# Patient Record
Sex: FEMALE | Race: BLACK OR AFRICAN AMERICAN | URBAN - METROPOLITAN AREA
[De-identification: names, ages, dates, MRNs, and addresses within clinical notes are randomized per-mention and may not be internally consistent; named-entity substitution may affect disease eponyms.]

---

## 2018-05-06 ENCOUNTER — HOSPITAL ENCOUNTER (EMERGENCY)
Facility: CLINIC | Age: 23
Discharge: HOME | End: 2018-05-06
Attending: EMERGENCY MEDICINE | Admitting: EMERGENCY MEDICINE
Payer: COMMERCIAL

## 2018-05-06 VITALS
SYSTOLIC BLOOD PRESSURE: 114 MMHG | HEIGHT: 60 IN | BODY MASS INDEX: 23.16 KG/M2 | OXYGEN SATURATION: 99 % | RESPIRATION RATE: 16 BRPM | DIASTOLIC BLOOD PRESSURE: 78 MMHG | TEMPERATURE: 98.7 F | WEIGHT: 118 LBS

## 2018-05-06 DIAGNOSIS — N94.9 VAGINAL DISCOMFORT: ICD-10-CM

## 2018-05-06 LAB
ALBUMIN UR-MCNC: 10 MG/DL
APPEARANCE UR: ABNORMAL
BACTERIA #/AREA URNS HPF: ABNORMAL /HPF
BILIRUB UR QL STRIP: NEGATIVE
COLOR UR AUTO: YELLOW
GLUCOSE UR STRIP-MCNC: NEGATIVE MG/DL
HCG UR QL: NEGATIVE
HGB UR QL STRIP: ABNORMAL
KETONES UR STRIP-MCNC: NEGATIVE MG/DL
LEUKOCYTE ESTERASE UR QL STRIP: ABNORMAL
MUCOUS THREADS #/AREA URNS LPF: PRESENT /LPF
NITRATE UR QL: NEGATIVE
PH UR STRIP: 5 PH (ref 5–7)
RBC #/AREA URNS AUTO: 3 /HPF (ref 0–2)
SOURCE: ABNORMAL
SP GR UR STRIP: 1.02 (ref 1–1.03)
SPECIMEN SOURCE: ABNORMAL
SQUAMOUS #/AREA URNS AUTO: 45 /HPF (ref 0–1)
UROBILINOGEN UR STRIP-MCNC: NORMAL MG/DL (ref 0–2)
WBC #/AREA URNS AUTO: 12 /HPF (ref 0–5)
WET PREP SPEC: ABNORMAL

## 2018-05-06 PROCEDURE — 87591 N.GONORRHOEAE DNA AMP PROB: CPT | Performed by: EMERGENCY MEDICINE

## 2018-05-06 PROCEDURE — 81025 URINE PREGNANCY TEST: CPT | Performed by: EMERGENCY MEDICINE

## 2018-05-06 PROCEDURE — 87210 SMEAR WET MOUNT SALINE/INK: CPT | Performed by: EMERGENCY MEDICINE

## 2018-05-06 PROCEDURE — 81001 URINALYSIS AUTO W/SCOPE: CPT | Performed by: EMERGENCY MEDICINE

## 2018-05-06 PROCEDURE — 87086 URINE CULTURE/COLONY COUNT: CPT | Performed by: EMERGENCY MEDICINE

## 2018-05-06 PROCEDURE — 87491 CHLMYD TRACH DNA AMP PROBE: CPT | Performed by: EMERGENCY MEDICINE

## 2018-05-06 PROCEDURE — 99283 EMERGENCY DEPT VISIT LOW MDM: CPT

## 2018-05-06 ASSESSMENT — ENCOUNTER SYMPTOMS: DYSURIA: 0

## 2018-05-06 NOTE — ED AVS SNAPSHOT
Emergency Department    [address] [place] [address]    Phone:  [phone]    Fax:  [phone]                                       Bradley Bowden   MRN: [id number]    Department:   Emergency Department   Date of Visit:  5/6/2018           Patient Information     Date Of Birth          1995        Your diagnoses for this visit were:     Vaginal discomfort        You were seen by Nelly Lara MD.      Follow-up Information     Follow up with Your gynecologist. Schedule an appointment as soon as possible for a visit in 1 week.        Discharge Instructions       *You may resume diet and activities as tolerated.  *No new medications.  *Follow-up with your gynecologist when you return to [place].  *Return if you become worse in any way.    .    Vaginal Infection: Understanding the Vaginal Environment  The vagina is a canal. It connects the uterus (womb) to the outside of the body. The vagina is home to many types of bacteria and other tiny organisms. These different bacteria most often stay balanced in number. This keeps the vagina healthy. If the balance changes, an infection may result.    A Healthy Environment  Many types of bacteria are present in a healthy vagina. They don t cause problems if their amounts stay balanced. Small amounts of yeast may also be present without causing problems. The most common type of bacteria in the vagina is lactobacillus. It helps keep the vagina at a low pH. A low pH keeps bad bacteria from taking over.  Normal Vaginal Discharge  The vagina makes fluid. It is sent out as discharge. This keeps the vagina healthy. Normal discharge can be clear, white, or yellowish. Most women find that normal discharge varies in amount and color through the month.  An Unhealthy Environment  The vaginal environment may get out of balance. This may result in a vaginal infection. There are a few reasons this can happen. The pH may have changed. The amount of one organism,  such as yeast, may increase. Or an outside organism may get into the vagina and throw off the balance:    Bacterial vaginosis (BV). BV is due to an imbalance in the normal bacteria in the vagina. Lactobacillus bacteria decrease. As a result, the numbers of bad bacteria increase.    Candidiasis(yeast infection). Yeast is a type of fungus. A yeast infection occurs when yeast cells in the vagina increase. They then attack vaginal tissues. A type of yeast called Candida albicans is often involved.    Trichomoniasis ( trich ). Trich is a parasite. It is passed from one person to another during sex. Men with trich often don t have any symptoms. In women, it can take weeks or months before symptoms appear.    [id number] [employer]. [address] [place] [address], [place] [address]. All rights reserved. This information is not intended as a substitute for professional medical care. Always follow your healthcare professional's instructions.  This information has been modified by your health care provider with permission from the publisher.                24 Hour Appointment Hotline       To make an appointment at any [facility] (formerly [facility]), call [phone] ([phone]). If you don't have a family doctor or clinic, we will help you find one. [facility] are conveniently located to serve the needs of you and your family.             Review of your medicines      Notice     You have not been prescribed any medications.            Procedures and tests performed during your visit     Chlamydia trachomatis PCR    HCG qualitative urine (UPT)    HSV 1 and 2 DNA by PCR    Neisseria gonorrhoea PCR    UA with Microscopic    Urine Culture Aerobic Bacterial    Wet prep      Orders Needing Specimen Collection     None      Pending Results     Date and Time Order Name Status Description    5/6/2018 1151 Urine Culture Aerobic Bacterial In process     5/6/2018 1022 Neisseria gonorrhoea PCR In process     5/6/2018 1022 Chlamydia  trachomatis PCR In process             Pending Culture Results     Date and Time Order Name Status Description    5/6/2018 1151 Urine Culture Aerobic Bacterial In process     5/6/2018 1022 Neisseria gonorrhoea PCR In process     5/6/2018 1022 Chlamydia trachomatis PCR In process             Pending Results Instructions     If you had any lab results that were not finalized at the time of your Discharge, you can call the ED Lab Result RN at [phone]. You will be contacted by this team for any positive Lab results or changes in treatment. The nurses are available 7 days a week from 10A to 6:30P.  You can leave a message 24 hours per day and they will return your call.        Test Results From Your Hospital Stay        5/6/2018 10:09 AM      Component Results     Component Value Ref Range & Units Status    Color Urine Yellow  Final    Appearance Urine Slightly Cloudy  Final    Glucose Urine Negative NEG^Negative mg/dL Final    Bilirubin Urine Negative NEG^Negative Final    Ketones Urine Negative NEG^Negative mg/dL Final    Specific Gravity Urine 1.017 1.003 - 1.035 Final    Blood Urine Trace (A) NEG^Negative Final    pH Urine 5.0 5.0 - 7.0 pH Final    Protein Albumin Urine 10 (A) NEG^Negative mg/dL Final    Urobilinogen mg/dL Normal 0.0 - 2.0 mg/dL Final    Nitrite Urine Negative NEG^Negative Final    Leukocyte Esterase Urine Large (A) NEG^Negative Final    Source Midstream Urine  Final    WBC Urine 12 (H) 0 - 5 /HPF Final    RBC Urine 3 (H) 0 - 2 /HPF Final    Bacteria Urine Moderate (A) NEG^Negative /HPF Final    Squamous Epithelial /HPF Urine 45 (H) 0 - 1 /HPF Final    Mucous Urine Present (A) NEG^Negative /LPF Final         5/6/2018 10:11 AM      Component Results     Component Value Ref Range & Units Status    HCG Qual Urine Negative NEG^Negative Final    This test is for screening purposes.  Results should be interpreted along with   the clinical picture.  Confirmation testing is available if warranted by    ordering NED859, HCG Quantitative Pregnancy.           5/6/2018 12:04 PM      Component Results     Component    Specimen Description    Cervical    Wet Prep    No Trichomonas seen    Wet Prep    No yeast seen    Wet Prep (Abnormal)    Clue cells seen    Wet Prep    Moderate  PMNs seen           5/6/2018 11:53 AM         5/6/2018 11:53 AM         5/6/2018 12:01 PM                Clinical Quality Measure: Blood Pressure Screening     Your blood pressure was checked while you were in the emergency department today. The last reading we obtained was  BP: 114/78 . Please read the guidelines below about what these numbers mean and what you should do about them.  If your systolic blood pressure (the top number) is less than 120 and your diastolic blood pressure (the bottom number) is less than 80, then your blood pressure is normal. There is nothing more that you need to do about it.  If your systolic blood pressure (the top number) is 120-139 or your diastolic blood pressure (the bottom number) is 80-89, your blood pressure may be higher than it should be. You should have your blood pressure rechecked within a year by a primary care provider.  If your systolic blood pressure (the top number) is 140 or greater or your diastolic blood pressure (the bottom number) is 90 or greater, you may have high blood pressure. High blood pressure is treatable, but if left untreated over time it can put you at risk for heart attack, stroke, or kidney failure. You should have your blood pressure rechecked by a primary care provider within the next 4 weeks.  If your provider in the emergency department today gave you specific instructions to follow-up with your doctor or provider even sooner than that, you should follow that instruction and not wait for up to 4 weeks for your follow-up visit.        Thank you for choosing [facility]       Thank you for choosing [facility] for your care. Our goal is always to provide you with excellent care.  "Hearing back from our patients is one way we can continue to improve our services. Please take a few minutes to complete the written survey that you may receive in the mail after you visit with us. Thank you!        [email or web address] Software Information     [employer] lets you send messages to your doctor, view your test results, renew your prescriptions, schedule appointments and more. To sign up, go to [email or web address] . Click on \"Log in\" on the left side of the screen, which will take you to the Welcome page. Then click on \"Sign up Now\" on the right side of the page.     You will be asked to enter the access code listed below, as well as some personal information. Please follow the directions to create your username and password.     Your access code is: [email or web address]  Expires: 2018  1:08 PM     Your access code will  in 90 days. If you need help or a new code, please call your [facility] clinic or [phone].        Care EveryWhere ID     This is your Care EveryWhere ID. This could be used by other organizations to access your [facility] medical records  [id number]        Equal Access to Services     [facility] : Hadii roni Tabor, katie randhawa, qacharlie cuevas, bob soto . So [facility] [phone].    ATENCIÓN: Si habla español, tiene a farrar disposición servicios gratuitos de asistencia lingüística. Chio al [phone].    We comply with applicable federal civil rights laws and [place] laws. We do not discriminate on the basis of race, color, national origin, age, disability, sex, sexual orientation, or gender identity.            After Visit Summary       This is your record. Keep this with you and show to your community pharmacist(s) and doctor(s) at your next visit.                  "

## 2018-05-06 NOTE — ED PROVIDER NOTES
History     Chief Complaint:  Dysuria    HPI   Bradley Bowden is a 22 year old female with a history of chlamydia who presents to the emergency department for evaluation of dysuria. To note, the patient lives in [place] and is here for two weeks visiting family. Of note, the patient reports having a D & C in April for excessive menstrual bleeding. At her follow up appointment from the D &C, she states she was told she had chlamydia and was treated with antibiotics. She notes her vagina swelled up after her treatment. Then a few days later, she reports having unprotected sex and notes vaginal discomfort following this. She states she was seen in [place] for this and then treated for STIs.Then, two days ago, the patient complains of dysuria along with vaginal discomfort and vaginal discharge following masturbation two nights ago. She states she called her Gynecologist, who cannot help the patient currently because the patient is out of town for two weeks. The patient is concerned she has herpes and would like to be tested for this. Of note, the patient also reports [place] at the airport 2 days [place] on an empty stomach and notes she had diarrhea following this. She states her anus burned after the diarrhea. She also notes that she had her anus waxed for hair removal the day prior. She denies other medical problems       Allergies:  NKDA     Medications:    The patient is not currently taking any prescribed medications.     Past Medical History:    Chlamydia    Past Surgical History:    D & C for excessive menstrual bleeding    Family History:    No past pertinent family history.    Social History:  Negative for tobacco use.  Positive for alcohol use     Review of Systems   Genitourinary: Negative for dysuria.   All other systems reviewed and are negative.    Physical Exam   First Vitals:  BP: 114/78  Heart Rate: 97  Temp: 98.7  F (37.1  C)  Resp: 16  Height: 152.4 cm (5')  Weight: 53.5 kg (118 lb)  SpO2:  99 %    Physical Exam  General: Well-nourished  Eyes: PERRL, conjunctivae pink no scleral icterus or conjunctival injection  ENT:  Moist mucus membranes, posterior oropharynx clear without erythema or exudates  Respiratory:  Lungs clear to auscultation bilaterally, no crackles/rubs/wheezes.  Good air movement  CV: Normal rate and rhythm, no murmurs/rubs/gallops  GI:  Abdomen soft and non-distended.  Normoactive BS.  No tenderness, guarding or rebound  : Cervix closed, scant white discharge, tiny area of irritation at posterior fourchette, no CMT, no uterine/adnexal TTP.  No lesions that appear to be blistered or erythematous  Rectal: Small hemorrhoid or skin fold at ~12 o'clock not bleeding and not thrombosed, no ulceration  Skin: Warm, dry.  No rashes or petechiae  Musculoskeletal: No peripheral edema or calf tenderness  Neuro: Alert and oriented to person/place/time  Psychiatric: Tearful and anxious affect      Emergency Department Course   Laboratory:  UA with micro: blood trace, protein albumin 10, Leukocyte Esterase large, WBC 12 (H), RBC 3 (H), bacteria moderate, Squamous epithelial 45 (H), mucous present o/w negative  Urine Culture Aerobic bacterial: In process  HCG qualitative urine: Negative    Wet prep: clue cells seen, otherwise negative  Chlamydia trachomatis:pending    Neisseria gonorrhea:pending    Emergency Department Course:  0954 Nursing notes and vitals reviewed.  I performed an exam of the patient as documented above.     IV inserted. Medicine administered as documented above. Blood drawn. This was sent to the lab for further testing, results above.  The patient provided a urine sample here in the emergency department. This was sent for laboratory testing, findings above.     1107 I rechecked the patient and discussed the results of her workup thus far.     1139 The patient had a pelvic exam while here in the emergency department, see findings above. The patient's nurse observed this.     1254 I  rechecked the patient and discussed the results of her workup thus far.     Findings and plan explained to the Patient. Patient discharged home with instructions regarding supportive care, medications, and reasons to return. The importance of close follow-up was reviewed. The patient was prescribed no new medications.    I personally reviewed the laboratory results with the Patient and answered all related questions prior to discharge.    Impression & Plan    Medical Decision Making:  Bradley Bowden is a 22 year old female who presents with concerns for possible STIS with some vaginal peritoneum irritation. She was seen on May 2 at the [facility] in [place]. I reviewed the culture results from here. The wet prep and culture were all negative for chlamydia gonorrhea and trichomoniasis. She has been treated on multiple occassions with antibiotics and she was recently treated for bacterial vaginosis. There is a tiny area that looks mildly inflamed at the posterior forschette but it does not appear to be consistent with a hepatic lesion to me. The patient is extremely concerned about this possibility so I did send a culture, a viral PCR swab off on this along with other more traditional swabs. She recently did have her anal area waxed and she has some discomfort there but I don't see anything that looks abnormal. Her wet prep here showed possible bacterial vaginosis but she had a tiny amount of blood on the cervix and she has already been treated for this. She declined treatment for this. The cultures are pending. We gave her a peripartum bottle to help as she has discomfort when she urinates. Her UA looks very contaminated. Urine culture is pending. She symptoms are more vaginal than urinary and she declined any antibiotics for this pending culture results. She is very anxious that this does represent a herpes infection. I discussed with her the possibility of empiric treatment but she wants to hold off for now. She will  be going home and can follow up with her own gynecologist in [place]. I asked her to return if she becomes worse in any way.    Critical Care time:  none    Diagnosis:    ICD-10-CM    1. Vaginal discomfort N94.9        Disposition:  discharged to home    Scribe Disclosure:   I, Nita Hannah, am serving as a scribe on 5/6/2018 at 9:53 AM to personally document services performed by Nelly Lara MD based on my observations and the provider's statements to me.     Nita Young  5/6/2018    EMERGENCY DEPARTMENT       Nelly Lara MD  05/06/18 [id number]

## 2018-05-06 NOTE — ED AVS SNAPSHOT
Emergency Department    [address] [place] [address]    Phone:  [phone]    Fax:  [phone]                                       Bradley Bowden   MRN: [id number]    Department:   Emergency Department   Date of Visit:  5/6/2018           After Visit Summary Signature Page     I have received my discharge instructions, and my questions have been answered. I have discussed any challenges I see with this plan with the nurse or doctor.    ..........................................................................................................................................  Patient/Patient Representative Signature      ..........................................................................................................................................  Patient Representative Print Name and Relationship to Patient    ..................................................               ................................................  Date                                            Time    ..........................................................................................................................................  Reviewed by Signature/Title    ...................................................              ..............................................  Date                                                            Time

## 2018-05-06 NOTE — DISCHARGE INSTRUCTIONS
*You may resume diet and activities as tolerated.  *No new medications.  *Follow-up with your gynecologist when you return to [place].  *Return if you become worse in any way.    .    Vaginal Infection: Understanding the Vaginal Environment  The vagina is a canal. It connects the uterus (womb) to the outside of the body. The vagina is home to many types of bacteria and other tiny organisms. These different bacteria most often stay balanced in number. This keeps the vagina healthy. If the balance changes, an infection may result.    A Healthy Environment  Many types of bacteria are present in a healthy vagina. They don t cause problems if their amounts stay balanced. Small amounts of yeast may also be present without causing problems. The most common type of bacteria in the vagina is lactobacillus. It helps keep the vagina at a low pH. A low pH keeps bad bacteria from taking over.  Normal Vaginal Discharge  The vagina makes fluid. It is sent out as discharge. This keeps the vagina healthy. Normal discharge can be clear, white, or yellowish. Most women find that normal discharge varies in amount and color through the month.  An Unhealthy Environment  The vaginal environment may get out of balance. This may result in a vaginal infection. There are a few reasons this can happen. The pH may have changed. The amount of one organism, such as yeast, may increase. Or an outside organism may get into the vagina and throw off the balance:    Bacterial vaginosis (BV). BV is due to an imbalance in the normal bacteria in the vagina. Lactobacillus bacteria decrease. As a result, the numbers of bad bacteria increase.    Candidiasis(yeast infection). Yeast is a type of fungus. A yeast infection occurs when yeast cells in the vagina increase. They then attack vaginal tissues. A type of yeast called Candida albicans is often involved.    Trichomoniasis ( trich ). Trich is a parasite. It is passed from one person to another during sex.  Men with trich often don t have any symptoms. In women, it can take weeks or months before symptoms appear.    [id number] [employer]. [address] [place] [address], [place] [address]. All rights reserved. This information is not intended as a substitute for professional medical care. Always follow your healthcare professional's instructions.  This information has been modified by your health care provider with permission from the publisher.

## 2018-05-07 LAB
BACTERIA SPEC CULT: NORMAL
BACTERIA SPEC CULT: NORMAL
C TRACH DNA SPEC QL NAA+PROBE: NEGATIVE
Lab: NORMAL
N GONORRHOEA DNA SPEC QL NAA+PROBE: NEGATIVE
SPECIMEN SOURCE: NORMAL

## 2021-04-23 ENCOUNTER — RX ONLY (RX ONLY)
Age: 26
End: 2021-04-23

## 2021-04-23 ENCOUNTER — SEE NOTE (OUTPATIENT)
Dept: URBAN - METROPOLITAN AREA CLINIC 31 | Facility: CLINIC | Age: 26
Setting detail: DERMATOLOGY
End: 2021-04-23

## 2021-04-23 DIAGNOSIS — D22.71 MELANOCYTIC NEVI OF RIGHT LOWER LIMB, INCLUDING HIP: ICD-10-CM

## 2021-04-23 DIAGNOSIS — D22.5 MELANOCYTIC NEVI OF TRUNK: ICD-10-CM

## 2021-04-23 DIAGNOSIS — L56.8 OTHER SPECIFIED ACUTE SKIN CHANGES DUE TO ULTRAVIOLET RADIATION: ICD-10-CM

## 2021-04-23 DIAGNOSIS — L73.8 OTHER SPECIFIED FOLLICULAR DISORDERS: ICD-10-CM

## 2021-04-23 PROBLEM — L659 704.00: Status: ACTIVE | Noted: 2021-04-23

## 2021-04-23 PROCEDURE — 99204 OFFICE O/P NEW MOD 45 MIN: CPT

## 2021-04-23 RX ORDER — DOXYCYCLINE HYCLATE 100 MG/1
1 CAPSULE CAPSULE, GELATIN COATED ORAL ONCE A DAY
Qty: 30 | Refills: 1
Start: 2021-04-23